# Patient Record
Sex: MALE | URBAN - METROPOLITAN AREA
[De-identification: names, ages, dates, MRNs, and addresses within clinical notes are randomized per-mention and may not be internally consistent; named-entity substitution may affect disease eponyms.]

---

## 2020-01-07 ENCOUNTER — OFFICE VISIT (OUTPATIENT)
Dept: URBAN - METROPOLITAN AREA CLINIC 33 | Facility: CLINIC | Age: 70
End: 2020-01-07
Payer: MEDICARE

## 2020-01-07 DIAGNOSIS — H25.11 AGE-RELATED NUCLEAR CATARACT, RIGHT EYE: ICD-10-CM

## 2020-01-07 DIAGNOSIS — E11.9 TYPE 2 DIABETES MELLITUS W/O COMPLICATION: Primary | ICD-10-CM

## 2020-01-07 DIAGNOSIS — Z96.1 PRESENCE OF INTRAOCULAR LENS: ICD-10-CM

## 2020-01-07 PROCEDURE — 99204 OFFICE O/P NEW MOD 45 MIN: CPT | Performed by: OPTOMETRIST

## 2020-01-07 ASSESSMENT — INTRAOCULAR PRESSURE
OD: 13
OS: 14

## 2020-01-07 ASSESSMENT — VISUAL ACUITY
OS: 20/25
OD: 20/20

## 2020-01-07 ASSESSMENT — KERATOMETRY
OD: 42.75
OS: 42.63

## 2020-01-07 NOTE — IMPRESSION/PLAN
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 Plan: Papilloma RUL near punctum.  Patient would like it to be evaluated by oculoplastics for removal.

## 2020-01-07 NOTE — IMPRESSION/PLAN
Impression: Type 2 diabetes mellitus w/o complication: U30.6. Plan: Diabetes w/o Complications: No signs of diabetic retinopathy noted. No treatment necessary at this time. Patient was instructed to monitor vision for sudden changes and to call if visual changes noted. Discussed ocular and systemic benefits of blood sugar control. Continue management with PCP. Letter sent to PCP regarding status of ocular health.

## 2020-01-07 NOTE — IMPRESSION/PLAN
Impression: Presence of intraocular lens: Z96.1. OS. Plan: Patient educated about today's findings. Lens implant is clear and centered without need for treatment at this time. Patient to call office with any problems.

## 2020-02-04 ENCOUNTER — OFFICE VISIT (OUTPATIENT)
Dept: URBAN - METROPOLITAN AREA CLINIC 23 | Facility: CLINIC | Age: 70
End: 2020-02-04
Payer: MEDICARE

## 2020-02-04 DIAGNOSIS — D48.1 NEOPLASM OF UNCERTAIN BEHAVIOR OF CONNECTIVE TISSUE OF EYELID: Primary | ICD-10-CM

## 2020-02-04 PROCEDURE — 99204 OFFICE O/P NEW MOD 45 MIN: CPT | Performed by: OPHTHALMOLOGY

## 2020-02-04 ASSESSMENT — INTRAOCULAR PRESSURE
OD: 24
OS: 26

## 2020-02-04 NOTE — IMPRESSION/PLAN
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 Photo Interpretation: supports clinical findings and dx documented in chart. Plan: Discussed diagnosis in detail with patient. Discussed treatment options with patient. Recommend biopsy, obtain prior authorization, further conduct after biopsy results. RBA's discussed in detail with patient today, patient understands and agrees and wishes to proceed with procedure. Schedule excision of lesion with biopsy of the Right Upper Eyelid NAD. Patient understands we will only biopsy one eye at a times, pending results on Right Upper Eyelid biopsy, will proceed with Left Upper Eyelid biopsy.

## 2020-02-28 ENCOUNTER — PROCEDURE (OUTPATIENT)
Dept: URBAN - METROPOLITAN AREA CLINIC 33 | Facility: CLINIC | Age: 70
End: 2020-02-28
Payer: MEDICARE

## 2020-02-28 PROCEDURE — 67840 REMOVE EYELID LESION: CPT | Performed by: OPHTHALMOLOGY

## 2020-02-28 RX ORDER — ERYTHROMYCIN 5 MG/G
OINTMENT OPHTHALMIC
Qty: 1 | Refills: 0 | Status: ACTIVE
Start: 2020-02-28